# Patient Record
(demographics unavailable — no encounter records)

---

## 2024-10-13 NOTE — END OF VISIT
[Time Spent: ___ minutes] : I have spent [unfilled] minutes of time on the encounter which excludes teaching and separately reported services. [FreeTextEntry3] : I, CHARLES Dan , personally performed the evaluation and management (E/M) services for this new patient.  That E/M includes conducting the clinically appropriate initial history &/or exam, assessing all conditions, and establishing the plan of care.  Today, my CARLOS, was here to observe &/or participate in the visit & follow plan of care established by me.

## 2024-10-13 NOTE — ASSESSMENT
[FreeTextEntry1] : 80 year old Serbian speaking F with PMHx of HTN, HLD, CAD (h/o positive stress test, declined cardiac catheterization), who presents for an initial evaluation and management of thoracic aortic aneurysm.   I have reviewed the patient's medical records, diagnostic images during the time of this office consultation and have made the following recommendation. CT and TTE were completed of the thoracic aorta. The report was reviewed and noted in the chart, I independently reviewed and interpreted images and report, and I reviewed the films/CD and agree.  Review of the imaging shows her aortic pathology has remained stable and does not require surgical intervention.  - [facility] with [facility] chest.  - F/u with endocrinologist regarding thyroid nodules.  - Continue medication regimen. - Follow up with cardiologist Dr. Salcedo and PCP. - Blood pressure management. - Discussed signs and symptoms that warrant emergency medical attention.

## 2024-10-13 NOTE — CONSULT LETTER
[Dear  ___] : Dear  [unfilled], [Please see my note below.] : Please see my note below. [Sincerely,] : Sincerely, [FreeTextEntry2] : Mp Salcedo MD [address] [facility],  [place] [address] Phone: [phone] f: [phone] [FreeTextEntry1] : Please find attached our consultation on your patient, Ms. BEAR GAMEZ .   We take a multidisciplinary team approach to patient care and consider you, the referring physician, an extension of our team. We will maintain an open line of communication with you throughout your patient's treatment course.    It is our commitment to provide your patient with the highest quality of advanced therapeutic options. We thank you for allowing us to participate in the care of your patient.   Please do not hesitate to contact our team with any questions or concerns at [phone]. [FreeTextEntry3] : Bebo Silvestre M.D. Professor of Cardiovascular and Thoracic Surgery Minimally Invasive Valve Surgeon Director of Aortic Surgery, [facility] Cell: [phone] Email: [email or web address]: [address] Floor, [place] [address] Office: [phone] Fax: [phone]   [facility]: Department of Cardiovascular and Thoracic Surgery [address] [place] [address], [facility] Office: [phone] Fax: [phone]   Practice Manager: Ms. Tamela Kelly Email: [email or web address] Phone: [phone]

## 2024-10-13 NOTE — HISTORY OF PRESENT ILLNESS
[FreeTextEntry1] : 80 year old Serbian speaking F with PMHx of HTN, HLD, CAD (h/o positive stress test, declined cardiac catheterization), who presents for an initial evaluation and management of thoracic aortic aneurysm. Pt is under the care of Dr. Salcedo.   CT chest wo IV contrast 9/25/24 ascending aortic aneurysm 4.8cm. (4.2cm in 2019 as per report).  thyroid goiter with tracheal deviation   TTE 9/25/24 EF 55-60%. mildly calcified aortic leaflets. no aortic stenosis. mild aortic regurgitation. ascending aorta 4.2cm.   Pt presents accompanied by her 2 daughters and son who assisted in translating the visit. Patient is doing well and denies recent hospitalization, ER visits, or surgeries. Pt reports occasional dizziness and occasional LE edema. She denies fever, chills, fatigue, headache, blurred vision, syncope, chest pain, palpitations, shortness of breath, orthopnea, paroxysmal nocturnal dyspnea, nausea, vomiting, abdominal pain, back pain, BRBPR.

## 2024-10-13 NOTE — ASSESSMENT
[FreeTextEntry1] : 80 year old Slovenian speaking F with PMHx of HTN, HLD, CAD (h/o positive stress test, declined cardiac catheterization), who presents for an initial evaluation and management of thoracic aortic aneurysm.   I have reviewed the patient's medical records, diagnostic images during the time of this office consultation and have made the following recommendation. CT and TTE were completed of the thoracic aorta. The report was reviewed and noted in the chart, I independently reviewed and interpreted images and report, and I reviewed the films/CD and agree.  Review of the imaging shows her aortic pathology has remained stable and does not require surgical intervention.  - [facility] 6 months with CTA chest.  - F/u with endocrinologist regarding thyroid nodules.  - Continue medication regimen. - Follow up with cardiologist Dr. Salcedo and PCP. - Blood pressure management. - Discussed signs and symptoms that warrant emergency medical attention.

## 2024-10-13 NOTE — PHYSICAL EXAM
[General Appearance - Alert] : alert [General Appearance - In No Acute Distress] : in no acute distress [Sclera] : the sclera and conjunctiva were normal [Extraocular Movements] : extraocular movements were intact [Outer Ear] : the ears and nose were normal in appearance [Neck Appearance] : the appearance of the neck was normal [] : no respiratory distress [Auscultation Breath Sounds / Voice Sounds] : lungs were clear to auscultation bilaterally [Heart Rate And Rhythm] : heart rate was normal and rhythm regular [Heart Sounds] : normal S1 and S2 [Heart Sounds Gallop] : no gallops [Murmurs] : no murmurs [Heart Sounds Pericardial Friction Rub] : no pericardial rub [2+] : left 2+ [Bowel Sounds] : normal bowel sounds [Abdomen Soft] : soft [Abdomen Tenderness] : non-tender [Abnormal Walk] : normal gait [Skin Color & Pigmentation] : normal skin color and pigmentation [Cranial Nerves] : cranial nerves 2-12 were intact [Oriented To Time, Place, And Person] : oriented to person, place, and time [Right Carotid Bruit] : no bruit heard over the right carotid [Left Carotid Bruit] : no bruit heard over the left carotid [FreeTextEntry2] : Mild LE edema noted  [FreeTextEntry1] : Deferred

## 2024-10-13 NOTE — HISTORY OF PRESENT ILLNESS
[FreeTextEntry1] : 80 year old Slovenian speaking F with PMHx of HTN, HLD, CAD (h/o positive stress test, declined cardiac catheterization), who presents for an initial evaluation and management of thoracic aortic aneurysm. Pt is under the care of Dr. Salcedo.   CT chest wo IV contrast 9/25/24 ascending aortic aneurysm 4.8cm. (4.2cm in 2019 as per report).  thyroid goiter with tracheal deviation   TTE 9/25/24 EF 55-60%. mildly calcified aortic leaflets. no aortic stenosis. mild aortic regurgitation. ascending aorta 4.2cm.   Pt presents accompanied by her 2 daughters and son who assisted in translating the visit. Patient is doing well and denies recent hospitalization, ER visits, or surgeries. Pt reports occasional dizziness and occasional LE edema. She denies fever, chills, fatigue, headache, blurred vision, syncope, chest pain, palpitations, shortness of breath, orthopnea, paroxysmal nocturnal dyspnea, nausea, vomiting, abdominal pain, back pain, BRBPR.

## 2024-10-13 NOTE — CONSULT LETTER
[Dear  ___] : Dear  [unfilled], [Please see my note below.] : Please see my note below. [Sincerely,] : Sincerely, [FreeTextEntry2] : Mp Salcedo MD [address] [facility],  [place] [address] Phone: [phone] f: [phone] [FreeTextEntry1] : Please find attached our consultation on your patient, Ms. BEAR GAMEZ .   We take a multidisciplinary team approach to patient care and consider you, the referring physician, an extension of our team. We will maintain an open line of communication with you throughout your patient's treatment course.    It is our commitment to provide your patient with the highest quality of advanced therapeutic options. We thank you for allowing us to participate in the care of your patient.   Please do not hesitate to contact our team with any questions or concerns at [phone]. [FreeTextEntry3] : Bebo Silvestre M.D. Professor of Cardiovascular and Thoracic Surgery Minimally Invasive Valve Surgeon Director of Aortic Surgery, [facility] Cell: [phone] Email: [email or web address]: [address], [address] Floor, [place] [address] Office: [phone] Fax: [phone]   [facility]: Department of Cardiovascular and Thoracic Surgery [address] [place] [address], [facility] Office: [phone] Fax: [phone]   Practice Manager: Ms. Tamela Kelly Email: [email or web address] Phone: [phone]

## 2024-10-13 NOTE — CONSULT LETTER
[Dear  ___] : Dear  [unfilled], [Please see my note below.] : Please see my note below. [Sincerely,] : Sincerely, [FreeTextEntry2] : Mp Salcedo MD [address] [facility],  [place] [address] Phone: [phone] f: [phone] [FreeTextEntry1] : Please find attached our consultation on your patient, Ms. BEAR GAMEZ .   We take a multidisciplinary team approach to patient care and consider you, the referring physician, an extension of our team. We will maintain an open line of communication with you throughout your patient's treatment course.    It is our commitment to provide your patient with the highest quality of advanced therapeutic options. We thank you for allowing us to participate in the care of your patient.   Please do not hesitate to contact our team with any questions or concerns at [phone]. [FreeTextEntry3] : Bebo Silvestre M.D. Professor of Cardiovascular and Thoracic Surgery Minimally Invasive Valve Surgeon Director of Aortic Surgery, [facility] Cell: [phone] Email: [email or web address]: [address] Floor, [place] [address] Office: [phone] Fax: [phone]   [facility]: Department of Cardiovascular and Thoracic Surgery [address] [place] [address], [facility] Office: [phone] Fax: [phone]   Practice Manager: Ms. Tamela Kelly Email: [email or web address] Phone: [phone]

## 2024-10-13 NOTE — HISTORY OF PRESENT ILLNESS
[FreeTextEntry1] : 80 year old Azeri speaking F with PMHx of HTN, HLD, CAD (h/o positive stress test, declined cardiac catheterization), who presents for an initial evaluation and management of thoracic aortic aneurysm. Pt is under the care of Dr. Salcedo.   CT chest wo IV contrast 9/25/24 ascending aortic aneurysm 4.8cm. (4.2cm in 2019 as per report).  thyroid goiter with tracheal deviation   TTE 9/25/24 EF 55-60%. mildly calcified aortic leaflets. no aortic stenosis. mild aortic regurgitation. ascending aorta 4.2cm.   Pt presents accompanied by her 2 daughters and son who assisted in translating the visit. Patient is doing well and denies recent hospitalization, ER visits, or surgeries. Pt reports occasional dizziness and occasional LE edema. She denies fever, chills, fatigue, headache, blurred vision, syncope, chest pain, palpitations, shortness of breath, orthopnea, paroxysmal nocturnal dyspnea, nausea, vomiting, abdominal pain, back pain, BRBPR.

## 2024-10-13 NOTE — ASSESSMENT
[FreeTextEntry1] : 80 year old Persian speaking F with PMHx of HTN, HLD, CAD (h/o positive stress test, declined cardiac catheterization), who presents for an initial evaluation and management of thoracic aortic aneurysm.   I have reviewed the patient's medical records, diagnostic images during the time of this office consultation and have made the following recommendation. CT and TTE were completed of the thoracic aorta. The report was reviewed and noted in the chart, I independently reviewed and interpreted images and report, and I reviewed the films/CD and agree.  Review of the imaging shows her aortic pathology has remained stable and does not require surgical intervention.  - [facility] 6 months with CTA chest.  - F/u with endocrinologist regarding thyroid nodules.  - Continue medication regimen. - Follow up with cardiologist Dr. Salcedo and PCP. - Blood pressure management. - Discussed signs and symptoms that warrant emergency medical attention.

## 2025-05-21 NOTE — DATA REVIEWED
[FreeTextEntry1] : As noted above.   CT chest wo IV contrast 9/25/24 ascending aortic aneurysm 4.8cm. (4.2cm in 2019 as per report).  thyroid goiter with tracheal deviation   TTE 9/25/24 EF 55-60%. mildly calcified aortic leaflets. no aortic stenosis. mild aortic regurgitation. ascending aorta 4.2cm.

## 2025-05-21 NOTE — ASSESSMENT
[FreeTextEntry1] : I have reviewed the patient's medical records, diagnostic images during the time of this office consultation and have made the following recommendation. CT and TTE were completed of the thoracic aorta. The reports were reviewed and noted in the chart, I independently reviewed and interpreted images and report, and I reviewed the films/CD and agree.  Review of the imaging shows her aortic pathology has remained stable and does not require surgical intervention.  - Follow up in [facility] in 6 months with CTA chest.  - F/u with endocrinologist regarding thyroid nodules.  - Continue medication regimen. - Follow up with cardiologist Dr. Salcedo and PCP. - Blood pressure management. - Discussed signs and symptoms that warrant emergency medical attention.

## 2025-05-21 NOTE — HISTORY OF PRESENT ILLNESS
[FreeTextEntry1] : 81 year old Danish speaking F with PMHx of HTN, HLD, CAD, who presents for a six months evaluation and management of thoracic aortic aneurysm. Pt is under the care of Dr. Salcedo.   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  multinodular goiter with associated mass effect on the trachea dilated left atrium   Pt presents accompanied by her daughter who assisted in translating the visit. Patient is doing well and denies recent hospitalization, ER visits, or surgeries. Pt reports occasional dizziness and occasional LE edema. She denies fever, chills, fatigue, headache, blurred vision, syncope, chest pain, palpitations, shortness of breath, orthopnea, paroxysmal nocturnal dyspnea, nausea, vomiting, abdominal pain, back pain, BRBPR.

## 2025-05-21 NOTE — PHYSICAL EXAM
[Sclera] : the sclera and conjunctiva were normal [Neck Appearance] : the appearance of the neck was normal [Jugular Venous Distention Increased] : there was no jugular-venous distention [] : no respiratory distress [Respiration, Rhythm And Depth] : normal respiratory rhythm and effort [Exaggerated Use Of Accessory Muscles For Inspiration] : no accessory muscle use [Auscultation Breath Sounds / Voice Sounds] : lungs were clear to auscultation bilaterally [Heart Rate And Rhythm] : heart rate was normal and rhythm regular [Heart Sounds] : normal S1 and S2 [Examination Of The Chest] : the chest was normal in appearance [2+] : left 2+ [Bowel Sounds] : normal bowel sounds [Abdomen Soft] : soft [FreeTextEntry1] : deferred [No CVA Tenderness] : no ~M costovertebral angle tenderness [Abnormal Walk] : normal gait [Skin Color & Pigmentation] : normal skin color and pigmentation [No Focal Deficits] : no focal deficits [Oriented To Time, Place, And Person] : oriented to person, place, and time